# Patient Record
Sex: MALE
[De-identification: names, ages, dates, MRNs, and addresses within clinical notes are randomized per-mention and may not be internally consistent; named-entity substitution may affect disease eponyms.]

---

## 2019-04-22 ENCOUNTER — APPOINTMENT (OUTPATIENT)
Dept: OTOLARYNGOLOGY | Facility: CLINIC | Age: 44
End: 2019-04-22
Payer: SELF-PAY

## 2019-04-22 VITALS
BODY MASS INDEX: 22.53 KG/M2 | HEIGHT: 73 IN | OXYGEN SATURATION: 99 % | SYSTOLIC BLOOD PRESSURE: 110 MMHG | WEIGHT: 170 LBS | DIASTOLIC BLOOD PRESSURE: 70 MMHG | TEMPERATURE: 98.7 F | HEART RATE: 65 BPM

## 2019-04-22 DIAGNOSIS — Z78.9 OTHER SPECIFIED HEALTH STATUS: ICD-10-CM

## 2019-04-22 DIAGNOSIS — H61.21 IMPACTED CERUMEN, RIGHT EAR: ICD-10-CM

## 2019-04-22 DIAGNOSIS — Z82.49 FAMILY HISTORY OF ISCHEMIC HEART DISEASE AND OTHER DISEASES OF THE CIRCULATORY SYSTEM: ICD-10-CM

## 2019-04-22 PROBLEM — Z00.00 ENCOUNTER FOR PREVENTIVE HEALTH EXAMINATION: Status: ACTIVE | Noted: 2019-04-22

## 2019-04-22 PROCEDURE — 92557 COMPREHENSIVE HEARING TEST: CPT

## 2019-04-22 PROCEDURE — 92550 TYMPANOMETRY & REFLEX THRESH: CPT

## 2019-04-22 PROCEDURE — 99204 OFFICE O/P NEW MOD 45 MIN: CPT

## 2019-04-22 RX ORDER — OMEPRAZOLE 40 MG/1
40 CAPSULE, DELAYED RELEASE ORAL
Qty: 14 | Refills: 0 | Status: ACTIVE | COMMUNITY
Start: 2019-04-22 | End: 1900-01-01

## 2019-04-22 RX ORDER — PREDNISONE 10 MG/1
10 TABLET ORAL
Qty: 45 | Refills: 0 | Status: ACTIVE | COMMUNITY
Start: 2019-04-22 | End: 1900-01-01

## 2019-04-22 NOTE — PHYSICAL EXAM
[de-identified] : r copious cerumen removed atraumaticually with suciton [Midline] : trachea located in midline position [Normal] : no rashes

## 2019-04-23 ENCOUNTER — TRANSCRIPTION ENCOUNTER (OUTPATIENT)
Age: 44
End: 2019-04-23

## 2019-04-25 PROBLEM — Z82.49 FAMILY HISTORY OF CARDIAC DISORDER: Status: ACTIVE | Noted: 2019-04-22

## 2019-04-25 PROBLEM — Z78.9 NO PERTINENT PAST MEDICAL HISTORY: Status: RESOLVED | Noted: 2019-04-22 | Resolved: 2019-04-25

## 2019-04-25 PROBLEM — Z78.9 NON-SMOKER: Status: ACTIVE | Noted: 2019-04-22

## 2019-05-06 ENCOUNTER — APPOINTMENT (OUTPATIENT)
Dept: OTOLARYNGOLOGY | Facility: CLINIC | Age: 44
End: 2019-05-06
Payer: SELF-PAY

## 2019-05-06 VITALS
TEMPERATURE: 98.5 F | DIASTOLIC BLOOD PRESSURE: 73 MMHG | SYSTOLIC BLOOD PRESSURE: 107 MMHG | OXYGEN SATURATION: 95 % | HEART RATE: 73 BPM

## 2019-05-06 DIAGNOSIS — H91.21 SUDDEN IDIOPATHIC HEARING LOSS, RIGHT EAR: ICD-10-CM

## 2019-05-06 PROCEDURE — 92550 TYMPANOMETRY & REFLEX THRESH: CPT

## 2019-05-06 PROCEDURE — 92557 COMPREHENSIVE HEARING TEST: CPT

## 2019-05-06 PROCEDURE — 99212 OFFICE O/P EST SF 10 MIN: CPT

## 2019-05-06 NOTE — HISTORY OF PRESENT ILLNESS
[de-identified] : followup 45 yo man s/o r julienl took prednisone as ordered  2 weeks ago and had minimal imp-rovement he notices. he is not dizzy. here to check his hearing the current loss is mild.